# Patient Record
Sex: MALE | Race: WHITE | NOT HISPANIC OR LATINO
[De-identification: names, ages, dates, MRNs, and addresses within clinical notes are randomized per-mention and may not be internally consistent; named-entity substitution may affect disease eponyms.]

---

## 2018-06-18 PROBLEM — Z00.00 ENCOUNTER FOR PREVENTIVE HEALTH EXAMINATION: Status: ACTIVE | Noted: 2018-06-18

## 2018-06-26 ENCOUNTER — APPOINTMENT (OUTPATIENT)
Dept: CARDIOLOGY | Facility: CLINIC | Age: 48
End: 2018-06-26

## 2019-01-29 ENCOUNTER — APPOINTMENT (OUTPATIENT)
Dept: CARDIOLOGY | Facility: CLINIC | Age: 49
End: 2019-01-29
Payer: COMMERCIAL

## 2019-01-29 ENCOUNTER — NON-APPOINTMENT (OUTPATIENT)
Age: 49
End: 2019-01-29

## 2019-01-29 VITALS
SYSTOLIC BLOOD PRESSURE: 131 MMHG | BODY MASS INDEX: 36.73 KG/M2 | HEART RATE: 82 BPM | OXYGEN SATURATION: 98 % | RESPIRATION RATE: 12 BRPM | DIASTOLIC BLOOD PRESSURE: 86 MMHG | HEIGHT: 69 IN | WEIGHT: 248 LBS | TEMPERATURE: 98.1 F

## 2019-01-29 DIAGNOSIS — Z82.49 FAMILY HISTORY OF ISCHEMIC HEART DISEASE AND OTHER DISEASES OF THE CIRCULATORY SYSTEM: ICD-10-CM

## 2019-01-29 DIAGNOSIS — Z80.8 FAMILY HISTORY OF MALIGNANT NEOPLASM OF OTHER ORGANS OR SYSTEMS: ICD-10-CM

## 2019-01-29 PROCEDURE — 99204 OFFICE O/P NEW MOD 45 MIN: CPT

## 2019-01-29 PROCEDURE — 93000 ELECTROCARDIOGRAM COMPLETE: CPT

## 2019-01-31 ENCOUNTER — APPOINTMENT (OUTPATIENT)
Dept: CARDIOLOGY | Facility: CLINIC | Age: 49
End: 2019-01-31
Payer: COMMERCIAL

## 2019-01-31 PROCEDURE — 93306 TTE W/DOPPLER COMPLETE: CPT

## 2019-01-31 NOTE — ASSESSMENT
[FreeTextEntry1] : 50 yo male with reported "abnormality" on prior stress test and echocardiogram > 5 years ago. ECG today demonstrates possible left atrial enlargement and non-specific T wave abnormalities.\par \par Will perform echocardiogram to assess LV function and structural heart disease given reported "abnormalities" on prior echo > 5 years ago.\par Will also perform treadmill stress ECG for ischemic evaluation/risk stratification given ECG abnormalities noted today. \par Pending review of test results, will determine if further cardiac work-up or intervention is clinically indicated.\par Smoking cessation was encouraged. \par Low fat, low salt diet, weight loss, and continued regular exercise were encouraged.\par Will request recent labs from PMD's office for review.

## 2019-01-31 NOTE — PHYSICAL EXAM
[General Appearance - Well Developed] : well developed [General Appearance - Well Nourished] : well nourished [General Appearance - In No Acute Distress] : no acute distress [No Oral Cyanosis] : no oral cyanosis [Normal Rate] : normal [Rhythm Regular] : regular [Normal S1] : normal S1 [Normal S2] : normal S2 [No Murmur] : no murmurs heard [2+] : left 2+ [No Pitting Edema] : no pitting edema present [] : no respiratory distress [Respiration, Rhythm And Depth] : normal respiratory rhythm and effort [Auscultation Breath Sounds / Voice Sounds] : lungs were clear to auscultation bilaterally [Bowel Sounds] : normal bowel sounds [Abnormal Walk] : normal gait [Nail Clubbing] : no clubbing of the fingernails [Cyanosis, Localized] : no localized cyanosis [Oriented To Time, Place, And Person] : oriented to person, place, and time [Affect] : the affect was normal [Mood] : the mood was normal [FreeTextEntry1] : No JVD present [S3] : no S3 [S4] : no S4 [Right Carotid Bruit] : no bruit heard over the right carotid [Left Carotid Bruit] : no bruit heard over the left carotid

## 2019-01-31 NOTE — HISTORY OF PRESENT ILLNESS
[FreeTextEntry1] : 48 yo male with reported "abnormality" on prior stress test and echocardiogram > 5 years ago. Patient denies any significant exertional complaints. Patient denies chest pain, dyspnea, palpitations, syncope, edema, melena, hematochezia, or hematemesis. He exercises 5 days per week doing treadmill/elliptical x 45 minutes and 45-60 minutes of weight training without complaints. \par \par PMD: Rene Cagle MD

## 2019-04-08 ENCOUNTER — MESSAGE (OUTPATIENT)
Age: 49
End: 2019-04-08

## 2019-12-26 ENCOUNTER — APPOINTMENT (OUTPATIENT)
Dept: GASTROENTEROLOGY | Facility: CLINIC | Age: 49
End: 2019-12-26
Payer: COMMERCIAL

## 2019-12-26 VITALS
DIASTOLIC BLOOD PRESSURE: 95 MMHG | HEART RATE: 77 BPM | HEIGHT: 69 IN | WEIGHT: 250 LBS | SYSTOLIC BLOOD PRESSURE: 157 MMHG | BODY MASS INDEX: 37.03 KG/M2

## 2019-12-26 DIAGNOSIS — R19.7 DIARRHEA, UNSPECIFIED: ICD-10-CM

## 2019-12-26 PROCEDURE — 99204 OFFICE O/P NEW MOD 45 MIN: CPT

## 2019-12-27 LAB
ALBUMIN SERPL ELPH-MCNC: 4.9 G/DL
ALP BLD-CCNC: 81 U/L
ALT SERPL-CCNC: 30 U/L
ANION GAP SERPL CALC-SCNC: 15 MMOL/L
AST SERPL-CCNC: 22 U/L
BASOPHILS # BLD AUTO: 0.03 K/UL
BASOPHILS NFR BLD AUTO: 0.4 %
BILIRUB SERPL-MCNC: 0.6 MG/DL
BUN SERPL-MCNC: 16 MG/DL
CALCIUM SERPL-MCNC: 10 MG/DL
CHLORIDE SERPL-SCNC: 100 MMOL/L
CO2 SERPL-SCNC: 26 MMOL/L
CREAT SERPL-MCNC: 1.09 MG/DL
CRP SERPL-MCNC: 0.72 MG/DL
ENDOMYSIUM IGA SER QL: NEGATIVE
ENDOMYSIUM IGA TITR SER: NORMAL
EOSINOPHIL # BLD AUTO: 0.07 K/UL
EOSINOPHIL NFR BLD AUTO: 0.9 %
ERYTHROCYTE [SEDIMENTATION RATE] IN BLOOD BY WESTERGREN METHOD: 35 MM/HR
GLUCOSE SERPL-MCNC: 78 MG/DL
HCT VFR BLD CALC: 46.8 %
HGB BLD-MCNC: 15.6 G/DL
IGA SER QL IEP: 338 MG/DL
IMM GRANULOCYTES NFR BLD AUTO: 0.9 %
LYMPHOCYTES # BLD AUTO: 2.17 K/UL
LYMPHOCYTES NFR BLD AUTO: 28.6 %
MAN DIFF?: NORMAL
MCHC RBC-ENTMCNC: 29.1 PG
MCHC RBC-ENTMCNC: 33.3 GM/DL
MCV RBC AUTO: 87.2 FL
MONOCYTES # BLD AUTO: 0.76 K/UL
MONOCYTES NFR BLD AUTO: 10 %
NEUTROPHILS # BLD AUTO: 4.49 K/UL
NEUTROPHILS NFR BLD AUTO: 59.2 %
PLATELET # BLD AUTO: 251 K/UL
POTASSIUM SERPL-SCNC: 4.8 MMOL/L
PROT SERPL-MCNC: 7.8 G/DL
RBC # BLD: 5.37 M/UL
RBC # FLD: 12.5 %
SODIUM SERPL-SCNC: 141 MMOL/L
WBC # FLD AUTO: 7.59 K/UL

## 2019-12-28 LAB
TTG IGA SER IA-ACNC: <1.2 U/ML
TTG IGA SER-ACNC: NEGATIVE
TTG IGG SER IA-ACNC: <1.2 U/ML
TTG IGG SER IA-ACNC: NEGATIVE

## 2019-12-28 NOTE — ASSESSMENT
[FreeTextEntry1] : change in bowel habits\par \par 2.  intolerant to wheat produts\par \par 3.  family history of colon cancer\par \par \par plan\par check labs...\par check celiac

## 2019-12-28 NOTE — HISTORY OF PRESENT ILLNESS
[FreeTextEntry1] : 1.  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 Cagle for the evaluation of a recent change in bowel function.\par \par this has occurred over the course of the last four weeks or longer.\par \par he has noted considerable gas, a change in the character of his stools, almost always unformed, loose, non bloody, sometimes watery, sometimes seeming to be greasy or oily, no blood or mucous,\par \par no abdominal pain, no cramps, no change in appetite or state of well being, and weight stable or perhaps increasing up to ten pounds.\par \par sometimes abdom bloating can be signif\par \par no use of alcohol significantly, quit smoiking about a year ago after smoking one half to one pack of cigarettes daily before that up to five years\par \par family history of colon cancer; mother 2016;  patient and his brother had colonoscopy around that time..\par \par negat for both.\par \par no meds\par no exertional chest pain.\par \par he feels fatigue when he gets these symptoms\par \par also, he has for perhaps up to ten years, noted a feeling of intolerance to certain wheat products, bread, rye, potatoes, etc..\par the intake of these products gives him fatige...

## 2019-12-28 NOTE — PHYSICAL EXAM
[General Appearance - Well-Appearing] : healthy appearing [Neck Appearance] : the appearance of the neck was normal [Jugular Venous Distention Increased] : there was no jugular-venous distention [Neck Cervical Mass (___cm)] : no neck mass was observed [Thyroid Diffuse Enlargement] : the thyroid was not enlarged [Thyroid Nodule] : there were no palpable thyroid nodules [Edema] : there was no peripheral edema [Full Pulse] : the pedal pulses are present [Auscultation Breath Sounds / Voice Sounds] : lungs were clear to auscultation bilaterally [Abdomen Soft] : soft [Abdomen Tenderness] : non-tender [Bowel Sounds] : normal bowel sounds [Abdomen Hernia] : no hernia was discovered [Abdomen Mass (___ Cm)] : no abdominal mass palpated [] : no hepato-splenomegaly [FreeTextEntry1] : high sphgincter tone, was able to only parly check the sphincter and rectum, defered for colon

## 2020-01-03 ENCOUNTER — APPOINTMENT (OUTPATIENT)
Dept: GASTROENTEROLOGY | Facility: HOSPITAL | Age: 50
End: 2020-01-03

## 2021-10-22 ENCOUNTER — NON-APPOINTMENT (OUTPATIENT)
Age: 51
End: 2021-10-22

## 2021-10-22 ENCOUNTER — APPOINTMENT (OUTPATIENT)
Dept: CARDIOLOGY | Facility: CLINIC | Age: 51
End: 2021-10-22
Payer: COMMERCIAL

## 2021-10-22 VITALS
DIASTOLIC BLOOD PRESSURE: 80 MMHG | HEART RATE: 78 BPM | BODY MASS INDEX: 40.43 KG/M2 | HEIGHT: 69 IN | OXYGEN SATURATION: 97 % | TEMPERATURE: 98.6 F | RESPIRATION RATE: 12 BRPM | WEIGHT: 273 LBS | SYSTOLIC BLOOD PRESSURE: 155 MMHG

## 2021-10-22 DIAGNOSIS — Z78.9 OTHER SPECIFIED HEALTH STATUS: ICD-10-CM

## 2021-10-22 DIAGNOSIS — F17.200 NICOTINE DEPENDENCE, UNSPECIFIED, UNCOMPLICATED: ICD-10-CM

## 2021-10-22 PROCEDURE — 99214 OFFICE O/P EST MOD 30 MIN: CPT

## 2021-10-22 PROCEDURE — 93000 ELECTROCARDIOGRAM COMPLETE: CPT

## 2021-10-22 NOTE — PHYSICAL EXAM
[Well Developed] : well developed [No Acute Distress] : no acute distress [Obese] : obese [Normal Conjunctiva] : normal conjunctiva [Normal Venous Pressure] : normal venous pressure [No Carotid Bruit] : no carotid bruit [Normal S1, S2] : normal S1, S2 [No Murmur] : no murmur [No Rub] : no rub [No Gallop] : no gallop [Clear Lung Fields] : clear lung fields [Good Air Entry] : good air entry [No Respiratory Distress] : no respiratory distress  [Normal Gait] : normal gait [No Edema] : no edema [No Cyanosis] : no cyanosis [No Clubbing] : no clubbing [Moves all extremities] : moves all extremities [No Focal Deficits] : no focal deficits [Normal Speech] : normal speech [Alert and Oriented] : alert and oriented [Normal memory] : normal memory

## 2021-11-02 ENCOUNTER — APPOINTMENT (OUTPATIENT)
Dept: CARDIOLOGY | Facility: CLINIC | Age: 51
End: 2021-11-02

## 2021-11-09 ENCOUNTER — APPOINTMENT (OUTPATIENT)
Dept: CARDIOLOGY | Facility: CLINIC | Age: 51
End: 2021-11-09
Payer: COMMERCIAL

## 2021-11-09 DIAGNOSIS — R94.31 ABNORMAL ELECTROCARDIOGRAM [ECG] [EKG]: ICD-10-CM

## 2021-11-09 PROCEDURE — 93306 TTE W/DOPPLER COMPLETE: CPT

## 2021-11-10 PROBLEM — R94.31 ABNORMAL ECG: Status: ACTIVE | Noted: 2019-01-29

## 2021-11-10 NOTE — HISTORY OF PRESENT ILLNESS
[FreeTextEntry1] : 50 yo male presents today for cardiac evaluation of intermittent chest pain and exertional dyspnea. He reports that he has not been exercising during the pandemic and has gained ~ 25 lbs since his last visit in 1/2019. He recently started walking again, but has developed intermittent chest pain (at rest and sometimes with exertion) and exertional dyspnea particularly when walking while carrying something in his arms. Patient denies palpitations, syncope, edema, melena, hematochezia, or hematemesis.  \par \par PMD: Rene Cagle MD

## 2021-11-10 NOTE — ASSESSMENT
[FreeTextEntry1] : 52 yo male with intermittent chest pain and exertional dyspnea.\par ECG today demonstrates sinus rhythm with non-specific T wave abnormalities.\par Last echo and stress ECG in 2019 were unremarkable studies.\par \par Will perform echocardiogram to assess LV function and structural heart disease.\par WIll perform treadmill stress ECG for ischemic evaluation/risk stratification.\par Pending test results, will determine if further cardiac work-up or intervention is clinically indicated.\par Will request recent labs from PMD's office for review.

## 2021-11-10 NOTE — CARDIOLOGY SUMMARY
[de-identified] : \par 10/22/21 ECG: Sinus, rate 77 bpm, non-specific T wave abnormalities\par 1/29/19 ECG: Sinus rhythm, rate 83 bpm, left atrial enlargement, non-specific T wave abnormalities \par   [de-identified] : \par 4/5/19 Treadmill stress ECG: Negative treadmill stess ECG at 87% max pred HR, 13.2 METs. No CP or ischemic ST-T changes. No arrhythmias during stress or recovery. Hypertensive BP response to exercise. Good HR recovery.\par  [de-identified] : \par 1/31/19 Normal LV size and systolic/diastolic function, LVEF 65%. Mildly increased LA volume index. Mild TR.

## 2021-11-16 ENCOUNTER — APPOINTMENT (OUTPATIENT)
Dept: CARDIOLOGY | Facility: CLINIC | Age: 51
End: 2021-11-16

## 2021-11-23 ENCOUNTER — APPOINTMENT (OUTPATIENT)
Dept: CARDIOLOGY | Facility: CLINIC | Age: 51
End: 2021-11-23

## 2021-11-29 ENCOUNTER — APPOINTMENT (OUTPATIENT)
Dept: CARDIOLOGY | Facility: CLINIC | Age: 51
End: 2021-11-29

## 2021-11-29 ENCOUNTER — APPOINTMENT (OUTPATIENT)
Dept: CARDIOLOGY | Facility: CLINIC | Age: 51
End: 2021-11-29
Payer: COMMERCIAL

## 2021-11-29 DIAGNOSIS — R07.9 CHEST PAIN, UNSPECIFIED: ICD-10-CM

## 2021-11-29 DIAGNOSIS — R06.02 SHORTNESS OF BREATH: ICD-10-CM

## 2021-11-29 PROCEDURE — 93015 CV STRESS TEST SUPVJ I&R: CPT

## 2022-01-11 ENCOUNTER — APPOINTMENT (OUTPATIENT)
Dept: NEUROLOGY | Facility: CLINIC | Age: 52
End: 2022-01-11

## 2022-07-28 ENCOUNTER — APPOINTMENT (OUTPATIENT)
Dept: BARIATRICS/WEIGHT MGMT | Facility: CLINIC | Age: 52
End: 2022-07-28

## 2022-07-28 VITALS
RESPIRATION RATE: 16 BRPM | DIASTOLIC BLOOD PRESSURE: 112 MMHG | OXYGEN SATURATION: 98 % | HEART RATE: 96 BPM | BODY MASS INDEX: 39.4 KG/M2 | HEIGHT: 69 IN | SYSTOLIC BLOOD PRESSURE: 163 MMHG | WEIGHT: 266 LBS

## 2022-07-28 DIAGNOSIS — Z87.39 PERSONAL HISTORY OF OTHER DISEASES OF THE MUSCULOSKELETAL SYSTEM AND CONNECTIVE TISSUE: ICD-10-CM

## 2022-07-28 DIAGNOSIS — E78.2 MIXED HYPERLIPIDEMIA: ICD-10-CM

## 2022-07-28 DIAGNOSIS — K76.0 FATTY (CHANGE OF) LIVER, NOT ELSEWHERE CLASSIFIED: ICD-10-CM

## 2022-07-28 DIAGNOSIS — Z99.89 OBSTRUCTIVE SLEEP APNEA (ADULT) (PEDIATRIC): ICD-10-CM

## 2022-07-28 DIAGNOSIS — Z83.3 FAMILY HISTORY OF DIABETES MELLITUS: ICD-10-CM

## 2022-07-28 DIAGNOSIS — Z87.898 PERSONAL HISTORY OF OTHER SPECIFIED CONDITIONS: ICD-10-CM

## 2022-07-28 DIAGNOSIS — E88.81 METABOLIC SYNDROME: ICD-10-CM

## 2022-07-28 DIAGNOSIS — E11.9 TYPE 2 DIABETES MELLITUS W/OUT COMPLICATIONS: ICD-10-CM

## 2022-07-28 DIAGNOSIS — G47.33 OBSTRUCTIVE SLEEP APNEA (ADULT) (PEDIATRIC): ICD-10-CM

## 2022-07-28 DIAGNOSIS — E66.9 OBESITY, UNSPECIFIED: ICD-10-CM

## 2022-07-28 DIAGNOSIS — I10 ESSENTIAL (PRIMARY) HYPERTENSION: ICD-10-CM

## 2022-07-28 DIAGNOSIS — N20.0 CALCULUS OF KIDNEY: ICD-10-CM

## 2022-07-28 PROCEDURE — 99205 OFFICE O/P NEW HI 60 MIN: CPT

## 2022-07-29 PROBLEM — E78.2 HYPERLIPIDEMIA, MIXED: Status: ACTIVE | Noted: 2022-07-29

## 2022-07-29 PROBLEM — E88.81 METABOLIC SYNDROME: Status: ACTIVE | Noted: 2022-07-29

## 2022-07-29 PROBLEM — N20.0 NEPHROLITHIASIS, URIC ACID: Status: RESOLVED | Noted: 2022-07-29 | Resolved: 2022-07-29

## 2022-07-29 PROBLEM — Z87.39 HISTORY OF GOUT: Status: RESOLVED | Noted: 2022-07-29 | Resolved: 2022-07-29

## 2022-07-29 PROBLEM — Z83.3 FAMILY HISTORY OF TYPE 2 DIABETES MELLITUS: Status: ACTIVE | Noted: 2022-07-29

## 2022-07-29 PROBLEM — K76.0 NAFLD (NONALCOHOLIC FATTY LIVER DISEASE): Status: RESOLVED | Noted: 2022-07-29 | Resolved: 2022-07-29

## 2022-07-29 PROBLEM — I10 ESSENTIAL HYPERTENSION: Status: ACTIVE | Noted: 2022-07-29

## 2022-07-29 PROBLEM — Z87.898 HISTORY OF SPLENOMEGALY: Status: RESOLVED | Noted: 2022-07-29 | Resolved: 2022-07-29

## 2022-07-29 PROBLEM — E66.9 OBESITY, CLASS II, BMI 35-39.9: Status: ACTIVE | Noted: 2022-07-29

## 2022-07-29 PROBLEM — G47.33 OSA ON CPAP: Status: RESOLVED | Noted: 2022-07-29 | Resolved: 2022-07-29

## 2022-07-29 RX ORDER — MULTIVIT-MIN/IRON/FOLIC ACID/K 18-600-40
CAPSULE ORAL
Refills: 0 | Status: DISCONTINUED | COMMUNITY
End: 2022-07-29

## 2022-07-29 RX ORDER — LANCETS 33 GAUGE
EACH MISCELLANEOUS
Qty: 1 | Refills: 0 | Status: ACTIVE | COMMUNITY
Start: 2022-07-29 | End: 1900-01-01

## 2022-07-29 RX ORDER — BLOOD SUGAR DIAGNOSTIC
STRIP MISCELLANEOUS
Qty: 30 | Refills: 0 | Status: ACTIVE | COMMUNITY
Start: 2022-07-29 | End: 1900-01-01

## 2022-07-29 RX ORDER — ISOPROPYL ALCOHOL 70 ML/100ML
SWAB TOPICAL
Qty: 1 | Refills: 3 | Status: ACTIVE | COMMUNITY
Start: 2022-07-29 | End: 1900-01-01

## 2022-07-29 RX ORDER — BLOOD-GLUCOSE METER
W/DEVICE KIT MISCELLANEOUS
Qty: 1 | Refills: 0 | Status: ACTIVE | COMMUNITY
Start: 2022-07-29 | End: 1900-01-01

## 2022-07-29 NOTE — REASON FOR VISIT
[Initial Consultation] : an initial consultation for [Diabetes Mellitus] : diabetes mellitus [Hyperlipidemia] : hyperlipidemia [Hypertension] : hypertension [Metabolic Syndrome] : metabolic syndrome [Obesity] : obesity [Obstructive Sleep Apena] : obstructive sleep apena

## 2022-07-29 NOTE — PHYSICAL EXAM
[Obese, well nourished, in no acute distress] : obese, well nourished, in no acute distress [Normal] : no stigmata of Cushing Syndrome [de-identified] : declines foot exam

## 2022-07-29 NOTE — HISTORY OF PRESENT ILLNESS
[FreeTextEntry1] : 52 year old male referred by Dr. Cagle for weight management and newly diagnosed DMT2\par Patient found to have diabetes when undoing preop testing for urological procedure- prior knew he had prediabetes.  Had been denied life insurance last year d/t uncontrolled HTN, HPL, weight, prediabetes\par Patient reports being hospitalized for HTN emergency in [place] in the past- declines taking medications for it states he doesn't want it in his body- does have headaches and episodes of blurred vision.  Has home BP cuff, has noticed improvement in home BP readings now consistently less than 180 SBP. \par Highest weight 255 a few weeks ago, lowest in 2004 about 200 pounds after doing WW \par Since diagnosis of DMT2 approximately 2 weeks ago has started to go to the gym- mixed cardio 20 minutes and strength training and has already made dietary changes such as cutting down on carbohydrates, takeout, and high fat foods.  \par , owns a [employer] business\par Food recall- B roldan egg and cheese on rye, D chickpea pasta, meatballs, homemade sauce, S- carrots \par Looking for program to help with accountability- does not want medications at this time\par Target 200 pounds\par \par BCA- Fax % 47.3, Fat mass 125.8, .2, BMR 1987

## 2022-07-29 NOTE — ASSESSMENT
[FreeTextEntry1] : 52 year old female with newly diagnosed DMT2, metabolic syndrome, class II obesity uncontrolled HTN\par Discussed pathophysiology of DMT2, HbA1C target <7%, microvascular complications and risk of cardiovascular complications, diabetic foot care, referred to opthalmology for dilated exam exam, recommended urine Ma/Cr testing- stated he would get it done with his urologist declined today\par Would like to start using home glucose monitor\par Discussed usage of ACE/ARB to protect kidneys and declines at this time\par Will f/u with RD- reviewed carbohydrate simple vs. carbs, portion sizes, healthy plate portions, recommended <60grams total carbs/meal and 15 gram snack with protein, preplanning and prepping meals\par Encouarged him to continue exercise regimen mixed cardio and strength training\par F/U with me 1 month after RD- if A1C remains >7% or if FBG>130mg/dL will recommend starting metformin and asked him to continue to self monitor BP at home and reconsider ACE/ARB.  Not interested in statin therapy at this time

## 2022-07-29 NOTE — CONSULT LETTER
[Dear  ___] : Dear  [unfilled], [Consult Letter:] : I had the pleasure of evaluating your patient, [unfilled]. [( Thank you for referring [unfilled] for consultation for _____ )] : Thank you for referring [unfilled] for consultation for [unfilled] [Please see my note below.] : Please see my note below. [Consult Closing:] : Thank you very much for allowing me to participate in the care of this patient.  If you have any questions, please do not hesitate to contact me. [Sincerely,] : Sincerely, [FreeTextEntry3] : Radha Figueroa FNP-BC, [facility] [place]

## 2022-08-31 ENCOUNTER — APPOINTMENT (OUTPATIENT)
Dept: BARIATRICS/WEIGHT MGMT | Facility: CLINIC | Age: 52
End: 2022-08-31

## 2022-10-14 ENCOUNTER — RX RENEWAL (OUTPATIENT)
Age: 52
End: 2022-10-14

## 2022-10-14 RX ORDER — BLOOD SUGAR DIAGNOSTIC
STRIP MISCELLANEOUS
Qty: 50 | Refills: 11 | Status: ACTIVE | COMMUNITY
Start: 2022-10-14 | End: 1900-01-01